# Patient Record
Sex: FEMALE | Race: WHITE | NOT HISPANIC OR LATINO | Employment: OTHER | URBAN - METROPOLITAN AREA
[De-identification: names, ages, dates, MRNs, and addresses within clinical notes are randomized per-mention and may not be internally consistent; named-entity substitution may affect disease eponyms.]

---

## 2018-12-06 ENCOUNTER — HOSPITAL ENCOUNTER (OUTPATIENT)
Facility: MEDICAL CENTER | Age: 63
End: 2018-12-06
Attending: OBSTETRICS & GYNECOLOGY
Payer: COMMERCIAL

## 2018-12-06 ENCOUNTER — HOSPITAL ENCOUNTER (OUTPATIENT)
Dept: LAB | Facility: MEDICAL CENTER | Age: 63
End: 2018-12-06
Attending: OBSTETRICS & GYNECOLOGY
Payer: COMMERCIAL

## 2018-12-06 PROCEDURE — 88175 CYTOPATH C/V AUTO FLUID REDO: CPT

## 2018-12-06 PROCEDURE — 87624 HPV HI-RISK TYP POOLED RSLT: CPT

## 2018-12-07 LAB
CYTOLOGY REG CYTOL: NORMAL
HPV HR 12 DNA CVX QL NAA+PROBE: NEGATIVE
HPV16 DNA SPEC QL NAA+PROBE: NEGATIVE
HPV18 DNA SPEC QL NAA+PROBE: NEGATIVE
SPECIMEN SOURCE: NORMAL

## 2021-03-03 DIAGNOSIS — Z23 NEED FOR VACCINATION: ICD-10-CM

## 2021-05-17 ENCOUNTER — HOSPITAL ENCOUNTER (OUTPATIENT)
Dept: RADIOLOGY | Facility: MEDICAL CENTER | Age: 66
End: 2021-05-17
Attending: INTERNAL MEDICINE
Payer: MEDICARE

## 2021-05-17 DIAGNOSIS — Z79.890 POSTMENOPAUSAL HORMONE THERAPY: ICD-10-CM

## 2021-05-17 PROCEDURE — 77080 DXA BONE DENSITY AXIAL: CPT

## 2021-06-03 ENCOUNTER — HOSPITAL ENCOUNTER (OUTPATIENT)
Dept: RADIOLOGY | Facility: MEDICAL CENTER | Age: 66
End: 2021-06-03
Attending: INTERNAL MEDICINE
Payer: MEDICARE

## 2021-06-03 DIAGNOSIS — Z82.49 FAMILY HISTORY OF ANEURYSM: ICD-10-CM

## 2021-06-03 PROCEDURE — 70544 MR ANGIOGRAPHY HEAD W/O DYE: CPT | Mod: MH

## 2021-11-08 ENCOUNTER — HOSPITAL ENCOUNTER (OUTPATIENT)
Dept: RADIOLOGY | Facility: MEDICAL CENTER | Age: 66
End: 2021-11-08
Attending: INTERNAL MEDICINE
Payer: MEDICARE

## 2021-11-08 DIAGNOSIS — I67.1 INTRACRANIAL ANEURYSM: ICD-10-CM

## 2021-11-08 ASSESSMENT — ENCOUNTER SYMPTOMS
HEADACHES: 0
FOCAL WEAKNESS: 0
SPEECH CHANGE: 0
WEAKNESS: 0
CONSTITUTIONAL NEGATIVE: 1
NEUROLOGICAL NEGATIVE: 1

## 2021-11-08 ASSESSMENT — LIFESTYLE VARIABLES: SUBSTANCE_ABUSE: 0

## 2021-11-08 NOTE — CONSULTS
Neuro Interventional Service Consultation      Re: Federica Lazo     MRN: [id number]   : 1955    Federica Lazo was referred to our service by Alton Lawton MD. She is a 66 y.o. female seen in clinic for evaluation and possible aneurysm intervention.     History of Present Illness:  Ms.Mc Villalobos has a significant family history of aneurysm with rupture. Her aunt and grandmother both  from aneurysm rupture and her brother had an aneurysm rupture in his 40's and survived. 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 She has been referred to the Neuro Interventional Service for evaluation and management of this finding.     She is seen today for review of imaging studies and discussion of possible aneurysm intervention. Today, the patient reports no problems with headache. She is familiar with her brother's symptoms of sentinel headache and denies any history of sudden severe headache. Blood pressure is controlled. She does not smoke or use illicit substances. She is a retired criminal law  and is unaccompanied to the consultation today.    No past medical history on file.  No past surgical history on file.  Social History     Socioeconomic History   • Marital status:      Spouse name: Not on file   • Number of children: Not on file   • Years of education: Not on file   • Highest education level: Not on file   Occupational History   • Not on file   Tobacco Use   • Smoking status: Not on file   • Smokeless tobacco: Not on file   Substance and Sexual Activity   • Alcohol use: Not on file   • Drug use: Not on file   • Sexual activity: Not on file   Other Topics Concern   • Not on file   Social History Narrative   • Not on file     Social Determinants of Health     Financial Resource Strain:    • Difficulty of Paying Living Expenses: Not on file   Food Insecurity:    • Worried About  Running Out of Food in the Last Year: Not on file   • Ran Out of Food in the Last Year: Not on file   Transportation Needs:    • Lack of Transportation (Medical): Not on file   • Lack of Transportation (Non-Medical): Not on file   Physical Activity:    • Days of Exercise per Week: Not on file   • Minutes of Exercise per Session: Not on file   Stress:    • Feeling of Stress : Not on file   Social Connections:    • Frequency of Communication with Friends and Family: Not on file   • Frequency of Social Gatherings with Friends and Family: Not on file   • Attends L[employer] day Services: Not on file   • Active Member of Clubs or Organizations: Not on file   • Attends Club or Organization Meetings: Not on file   • Marital Status: Not on file   Intimate Partner Violence:    • Fear of Current or Ex-Partner: Not on file   • Emotionally Abused: Not on file   • Physically Abused: Not on file   • Sexually Abused: Not on file   Housing Stability:    • Unable to Pay for Housing in the Last Year: Not on file   • Number of Places Lived in the Last Year: Not on file   • Unstable Housing in the Last Year: Not on file     No family history on file.    Review of Systems   Constitutional: Negative.    Skin: Negative.    Neurological: Negative.  Negative for speech change, focal weakness, weakness and headaches.   Psychiatric/Behavioral: Negative for substance abuse.     A comprehensive 14-point review of systems was negative except as described above.     Labs:   No results available for review    Radiology:   MRA head on 6/3/21 at [facility]:  1.  4 mm right MCA trifurcation aneurysm.  2.  Otherwise unremarkable MRA of the head.      No current outpatient medications on file.     No current facility-administered medications for this encounter.       Not on File    Physical Exam  Constitutional:       General: She is not in acute distress.     Appearance: She is not diaphoretic.   HENT:      Head: Normocephalic.   Eyes:      General: No scleral  icterus.  Pulmonary:      Effort: Pulmonary effort is normal. No respiratory distress.   Abdominal:      General: There is no distension.   Skin:     General: Skin is warm and dry.      Coloration: Skin is not pale.      Findings: No erythema or rash.   Neurological:      General: No focal deficit present.      Mental Status: She is alert and oriented to person, place, and time. She is not disoriented.      Cranial Nerves: No cranial nerve deficit or facial asymmetry.      Sensory: Sensation is intact.      Motor: No weakness or tremor.      Coordination: Coordination normal.      Gait: Gait is intact. Gait normal.   Psychiatric:         Mood and Affect: Mood and affect normal.         Behavior: Behavior normal.         Thought Content: Thought content normal.         Cognition and Memory: Memory normal.         Judgment: Judgment normal.     PHASES Aneurysm Risk Score: 2    Impression:   1. 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     Plan:   Carter Jorge MD has reviewed Ms.Mc Villalobos's history and imaging studies, examined the patient, and discussed treatment options. Ms.Mc Villalobos has a small asymptomatic finding of right middle cerebral artery trifurcation dilatation versus aneurysm. A bulbous dilatation is a normal anatomic variant. If this represents an aneurysm, aneurysms in this location carry a statistical cumulative 5 year rupture risk approaching 0%, however we explained that even small aneurysms can rupture. Overall procedure risk is approximately 1-3%. Because of her family history, she may be at greater risk for rupture in her expected lifetime. Her modifiable risk factors are well controlled. We explained that should she wish to pursue workup toward intervention or gather more data before making a decision, the next step would be a catheter angiogram with 3D reconstruction. Should she consider angiography  and/or intervention, we discussed the method of the procedure at length including the possibility of the use of stents to facilitate aneurysm coiling. There is a chance the aneurysm may ultimately not be amenable to endovascular intervention. After the procedure, there is a chance that the aneurysm could recur and surveillance is required for a period of at least 3 years after intervention. We reviewed known risk factors for aneurysm recurrence that include uncontrolled hypertension and tobacco use. We discussed signs of a sentinel headache and stroke with instructions to call an ambulance for the onset of these symptoms. The patient has considered her options and elects surveillance. She is less worried about the finding now that she has reviewed the imaging studies and discussed long term rupture risk with the [facility] physician. We will arrange a MRA head w/o konstantin in June 2022. In the interim, the patient has our contact information for any additional questions or concerns. In addition to the above, we recommend her family members on the aneurysm rupture side consider screening with MRA due to the strong family history of aneurysm rupture.     MARY Gilmore with Carter Jorge MD  Neuro Interventional Service   [address] ([id number])  [place] [address]  [phone]

## 2021-12-14 ENCOUNTER — HOSPITAL ENCOUNTER (OUTPATIENT)
Dept: LAB | Facility: MEDICAL CENTER | Age: 66
End: 2021-12-14
Attending: OBSTETRICS & GYNECOLOGY
Payer: MEDICARE

## 2021-12-14 PROCEDURE — 88175 CYTOPATH C/V AUTO FLUID REDO: CPT

## 2021-12-14 PROCEDURE — 87624 HPV HI-RISK TYP POOLED RSLT: CPT

## 2021-12-29 ENCOUNTER — HOSPITAL ENCOUNTER (OUTPATIENT)
Dept: RADIOLOGY | Facility: MEDICAL CENTER | Age: 66
End: 2021-12-29
Attending: OBSTETRICS & GYNECOLOGY
Payer: MEDICARE

## 2022-06-23 ENCOUNTER — HOSPITAL ENCOUNTER (OUTPATIENT)
Dept: RADIOLOGY | Facility: MEDICAL CENTER | Age: 67
End: 2022-06-23
Attending: INTERNAL MEDICINE
Payer: MEDICARE

## 2022-06-23 DIAGNOSIS — I67.1 CEREBRAL ANEURYSM, NONRUPTURED: ICD-10-CM

## 2022-06-23 PROCEDURE — 70544 MR ANGIOGRAPHY HEAD W/O DYE: CPT | Mod: MH

## 2022-11-07 ENCOUNTER — PATIENT MESSAGE (OUTPATIENT)
Dept: HEALTH INFORMATION MANAGEMENT | Facility: OTHER | Age: 67
End: 2022-11-07

## 2024-02-26 ENCOUNTER — HOSPITAL ENCOUNTER (OUTPATIENT)
Dept: RADIOLOGY | Facility: MEDICAL CENTER | Age: 69
End: 2024-02-26
Attending: NURSE PRACTITIONER
Payer: MEDICARE

## 2024-02-26 DIAGNOSIS — M81.0 SENILE OSTEOPOROSIS: ICD-10-CM

## 2024-02-26 PROCEDURE — 77080 DXA BONE DENSITY AXIAL: CPT

## 2024-12-26 ENCOUNTER — HOSPITAL ENCOUNTER (OUTPATIENT)
Facility: MEDICAL CENTER | Age: 69
End: 2024-12-26
Attending: OBSTETRICS & GYNECOLOGY
Payer: MEDICARE

## 2024-12-26 PROCEDURE — 88142 CYTOPATH C/V THIN LAYER: CPT

## 2025-01-02 LAB
HPV I/H RISK 1 DNA SPEC QL NAA+PROBE: NOT DETECTED
SPECIMEN SOURCE: NORMAL
THINPREP PAP, CYTOLOGY NL11781: NORMAL